# Patient Record
Sex: MALE | Race: WHITE
[De-identification: names, ages, dates, MRNs, and addresses within clinical notes are randomized per-mention and may not be internally consistent; named-entity substitution may affect disease eponyms.]

---
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
## 2019-02-03 ENCOUNTER — HOSPITAL ENCOUNTER (EMERGENCY)
Dept: HOSPITAL 25 - UCCORT | Age: 19
Discharge: HOME | End: 2019-02-03
Payer: SELF-PAY

## 2019-02-03 VITALS — DIASTOLIC BLOOD PRESSURE: 75 MMHG | SYSTOLIC BLOOD PRESSURE: 129 MMHG

## 2019-02-03 DIAGNOSIS — Z77.22: ICD-10-CM

## 2019-02-03 DIAGNOSIS — J06.9: Primary | ICD-10-CM

## 2019-02-03 PROCEDURE — G0463 HOSPITAL OUTPT CLINIC VISIT: HCPCS

## 2019-02-03 PROCEDURE — 99212 OFFICE O/P EST SF 10 MIN: CPT

## 2019-02-03 NOTE — UC
Respiratory Complaint HPI





- HPI Summary


HPI Summary: 





Cough and congestion for two days without fever or myalgias. 





- History of Current Complaint


Chief Complaint: UCRespiratory


Stated Complaint: COUGH


Time Seen by Provider: 02/03/19 07:19


Hx Obtained From: Patient


Onset/Duration: Gradual Onset, Lasting Days


Timing: Constant


Severity Initially: Moderate


Severity Currently: Moderate


Pain Intensity: 0


Character: Cough: Nonproductive


Aggravating Factors: Deep Breaths, Recumbent Position


Alleviating Factors: Upright Position, Spontaneous Resolution


Associated Signs And Symptoms: Positive: URI, Nasal Congestion.  Negative: 

Dyspnea, Fever, Chills, Pleuritic Chest Pain, Calf Pain, Calf Swelling





- Allergies/Home Medications


Allergies/Adverse Reactions: 


 Allergies











Allergy/AdvReac Type Severity Reaction Status Date / Time


 


No Known Allergies Allergy   Verified 02/03/19 07:18














PMH/Surg Hx/FS Hx/Imm Hx


Previously Healthy: Yes - non smoker. No hx of asthma. 





- Surgical History


Surgical History: Yes


Surgery Procedure, Year, and Place: tonsils removed.  tubes in ears.  left knee 

surgery.  R shoulder





- Family History


Known Family History: Positive: Other - asthma.





- Social History


Alcohol Use: None


Substance Use Type: None


Smoking Status (MU): Never Smoked Tobacco


Household Exposure Type: Cigarettes





Review of Systems


All Other Systems Reviewed And Are Negative: Yes


ENT: Positive: Sinus Congestion


Respiratory: Positive: Cough


Is Patient Immunocompromised?: No





Physical Exam


Triage Information Reviewed: Yes


Appearance: Well-Appearing, No Pain Distress, Well-Nourished


Vital Signs: 


 Initial Vital Signs











Temp  97.1 F   02/03/19 07:13


 


Pulse  71   02/03/19 07:13


 


Resp  16   02/03/19 07:13


 


BP  129/75   02/03/19 07:13


 


Pulse Ox  100   02/03/19 07:13











Vital Signs Reviewed: Yes


Eyes: Positive: Conjunctiva Clear


ENT: Positive: Normal ENT inspection, Pharynx normal, Pharyngeal erythema, 

Nasal congestion, Uvula midline.  Negative: Nasal drainage, TMs normal, TM 

bulging, TM dull, TM red, Tonsillar swelling, Tonsillar exudate, Trismus, Sinus 

tenderness


Neck: Positive: Supple, Nontender, No Lymphadenopathy


Respiratory: Positive: Lungs clear, Normal breath sounds, No respiratory 

distress, No accessory muscle use.  Negative: Respiratory distress, Decreased 

breath sounds, Accessory muscle use, Crackles, Rhonchi, Stridor, Wheezing


Cardiovascular: Positive: No Murmur, Pulses Normal


Abdomen Description: Positive: No Organomegaly, Soft.  Negative: Distended, 

Guarding


Musculoskeletal: Positive: Strength Intact, ROM Intact, No Edema


Neurological: Positive: Alert, Muscle Tone Normal.  Negative: Fatigued


Psychological: Positive: Age Appropriate Behavior


Skin: Negative: Rashes





UC Diagnostic Evaluation





- Laboratory


O2 Sat by Pulse Oximetry: 100





Respiratory Course/Dx





- Differential Dx/Diagnosis


Provider Diagnosis: 


 URI, acute








Discharge





- Sign-Out/Discharge


Documenting (check all that apply): Patient Departure


All imaging exams completed and their final reports reviewed: No Studies





- Discharge Plan


Condition: Good


Disposition: HOME


Prescriptions: 


Benzonatate CAP* [Tessalon 100 MG CAP*] 100 mg PO TID #20 cap


Patient Education Materials:  Upper Respiratory Infection (ED)


Referrals: 


No Primary Care Phys,NOPCP [Primary Care Provider] - 


Additional Instructions: 


robitussin DM or mucinex DM. Also tea with honey and teaspoon of honey with 

cough. 





- Billing Disposition and Condition


Condition: GOOD


Disposition: Home